# Patient Record
Sex: MALE | URBAN - METROPOLITAN AREA
[De-identification: names, ages, dates, MRNs, and addresses within clinical notes are randomized per-mention and may not be internally consistent; named-entity substitution may affect disease eponyms.]

---

## 2021-04-28 ENCOUNTER — HOSPITAL ENCOUNTER (OUTPATIENT)
Dept: GENERAL RADIOLOGY | Age: 61
Discharge: HOME | End: 2021-04-30

## 2021-04-28 DIAGNOSIS — T14.90XA INJURY: ICD-10-CM

## 2021-04-28 PROCEDURE — 73562 X-RAY EXAM OF KNEE 3: CPT

## 2023-03-22 ENCOUNTER — HOSPITAL ENCOUNTER (OUTPATIENT)
Dept: PHYSICAL THERAPY | Facility: CLINIC | Age: 63
Setting detail: THERAPIES SERIES
Discharge: HOME | End: 2023-03-22
Payer: COMMERCIAL

## 2023-03-22 PROCEDURE — 97161 PT EVAL LOW COMPLEX 20 MIN: CPT

## 2023-03-22 PROCEDURE — 97110 THERAPEUTIC EXERCISES: CPT

## 2023-03-22 NOTE — CONSULTS
services in order to: Improve radha LE flexibility, improve radha LE strength, and help reduce numbness in L LE to help ease biking recreationally. Problems:    [x] ? Pain  [x] ? ROM  [x] ? Strength  [x] ? Function        Goals  MET NOT MET ON-  GOING  Details   Date Addressed:       STG: To be met in 5 treatments           1. Improve score on assessment tool LEFS from 33% impairment to less than 23% impairment, demonstrating improved tolerance to activity to ease biking recreationally. []  []  []      2. ? ROM: Increase flexibility in L LE to help improve LE symptoms and ease sitting at work for extended periods. []  []  []      3. Independent with Home Exercise Programs []  []  []      []  []  []     Date Addressed:        LTG: To be met in 9 treatments       1. Improve score on assessment tool LEFS from 33% impairment to less than 13% impairment, demonstrating improved tolerance to activity to ease biking recreationally. []  []  []     2. Reduce pain levels to 0/10 with all activity to help reduce risk for compensations with all walking and lifting at work.  []  []  []     3. ? Strength: Increase radha hip/core strength to 4/5 grossly to help improve trunk stability with all lifting at work. []  []  []                Patient goals: Less pain    Rehab Potential:  [x] Good  [] Fair  [] Poor   Suggested Professional Referral:  [x] No  [] Yes:  Barriers to Goal Achievement[de-identified]  [x] No  [] Yes:  Domestic Concerns:  [x] No  [] Yes:    Pt. Education:  [x] Plans/Goals, Risks/Benefits discussed  [x] Home exercise program    Method of Education: [x] Verbal  [x] Demo  [x] Written    Access Code: [email or web address]  URL: [employer] com/  Date: 03/22/2023  Prepared by:  Jennifer Bryan    Exercises  - Standing Hamstring Stretch with Step  - 3 x daily - 7 x weekly - 3 sets - 30 seconds hold  - Hip Flexor Stretch with Chair  - 3 x daily - 7 x weekly - 3 sets - 30 seconds hold  - Standing ITB Stretch  - 3 x daily - 7 x weekly - 3

## 2023-03-25 ENCOUNTER — HOSPITAL ENCOUNTER (OUTPATIENT)
Dept: MRI IMAGING | Age: 63
End: 2023-03-25
Payer: COMMERCIAL

## 2023-03-25 DIAGNOSIS — S76.012A STRAIN OF HIP, LEFT, INITIAL ENCOUNTER: ICD-10-CM

## 2023-03-25 PROCEDURE — 73721 MRI JNT OF LWR EXTRE W/O DYE: CPT

## 2023-03-27 ENCOUNTER — HOSPITAL ENCOUNTER (OUTPATIENT)
Dept: PHYSICAL THERAPY | Facility: CLINIC | Age: 63
Setting detail: THERAPIES SERIES
Discharge: HOME | End: 2023-03-27
Payer: COMMERCIAL

## 2023-03-27 PROCEDURE — 97110 THERAPEUTIC EXERCISES: CPT

## 2023-03-27 NOTE — FLOWSHEET NOTE
[x] [address]    Outpatient Rehabilitation &  Therapy  [address] [facility]  P: [phone]  F: [phone]      Physical Therapy Daily Treatment Note    Date:  3/27/2023  Patient: Stefany Dumas                     : 1960                      MRN: [id number]  Physician: ANA Chan                        Insurance:  (3x/week x 3 weeks; 3/8/23-23)  Medical Diagnosis: A75.463L (ICD-10-CM) - Strain of unspecified muscles, fascia and tendons at thigh level, left thigh, initial encounter                   Rehab Codes: M25. 552, R20.2  Onset date: 23                           Next Dr's appt. : 3/27/23  Visit# / total visits:      Cancels/No Shows:     Subjective: pt states he felt pretty good after eval, noting that he was surprised. Some pain and soreness from work. Pain:  [x] Yes  [] No Location: L hip  Pain Rating: (0-10 scale) 1/10  Pain altered Tx:  [] No  [] Yes  Action:  Comments:    Todays Treatment:  Precautions: General  Exercise  L Hip Reps/ Time Weight/ Level Comments   scifit 6'  L1                SB S 3x30\"       HS step S 3x30\"       Hip flexor step S 3x30\"       SKTC S 3x30\"       IT band S 3x30\"   L Hip only             Bridges 2x10 orange      Clamshells 2x10 orange     SL hip abd 2x10 orange              Monsterwalks   2 laps  orange      SB wall squats 20x       Powerstride  20x  6\"      Other:     Specific Instructions for next treatment: Continue tx per POC      Treatment Charges: Mins Units Time In/Out   []  Modalities         [x]  Ther Exercise 40 3 6:00-6:45   []  Neuromuscular Re-ed         []  Gait Training         []  Manual Therapy         []  Ther Activities         []  Aquatics         []  Vasocompression         []  Cervical Traction         []  Other         Total Treatment time 40 min 3         Assessment: [x] Progressing toward goals. Initiated treatment on scifit followed by stretches with good tolerance.  Added in strengthening

## 2023-03-29 ENCOUNTER — HOSPITAL ENCOUNTER (OUTPATIENT)
Dept: PHYSICAL THERAPY | Facility: CLINIC | Age: 63
Setting detail: THERAPIES SERIES
Discharge: HOME | End: 2023-03-29
Payer: COMMERCIAL

## 2023-03-29 PROCEDURE — 97110 THERAPEUTIC EXERCISES: CPT

## 2023-03-29 NOTE — FLOWSHEET NOTE
[x] [address]    Outpatient Rehabilitation &  Therapy  [address] [place] [address]  P: [phone]  F: [phone]      Physical Therapy Daily Treatment Note    Date:  3/29/2023  Patient: Akash Bustos                     : 1960                      MRN: [id number]  Physician: ANA Bowen                        Insurance:  (3x/week x 3 weeks; 3/8/23-23)  Medical Diagnosis: S44.040F (ICD-10-CM) - Strain of unspecified muscles, fascia and tendons at thigh level, left thigh, initial encounter                   Rehab Codes: M25. 552, R20.2  Onset date: 23                           Next Dr's appt. : 3/27/23  Visit# / total visits: 3/9     Cancels/No Shows:     Subjective:   Pain:  [] Yes  [x] No Location: L hip  Pain Rating: (0-10 scale) 1/10  Pain altered Tx:  [] No  [] Yes  Action:  Comments: Pt reports numbness is covering less of an area. Todays Treatment:  Precautions: General  Exercise  L Hip Reps/ Time Weight/ Level Comments           Airdyne 8'    x              SB S 3x30\"     x   HS step S 3x30\"     x   Hip flexor step S 3x30\"     x   SKTC S 3x30\"     x   IT band S 3x30\"   L Hip only               Bridges 2x15 orange    x   Clamshells 2x15 orange   x   SL hip abd 2x15 orange   x             Monsterwalks   2 laps  orange    x   SB wall squats 20x     x   Powerstride  20x  6\"       4 way hip X15 B orange  x   Other:     Specific Instructions for next treatment: Increase tband resistance      Treatment Charges: Mins Units Time In/Out   []  Modalities         [x]  Ther Exercise 44 3 3:40-4:24   []  Neuromuscular Re-ed         []  Gait Training         []  Manual Therapy         []  Ther Activities         []  Aquatics         []  Vasocompression         []  Cervical Traction         []  Other         Total Treatment time 44 3     8' warm up not billed - 3:32-3:40    Assessment: [x] Progressing toward goals. Airdyne for a warm up followed by stretches.  Progressed

## 2023-03-30 ENCOUNTER — HOSPITAL ENCOUNTER (OUTPATIENT)
Dept: PHYSICAL THERAPY | Facility: CLINIC | Age: 63
Setting detail: THERAPIES SERIES
Discharge: HOME | End: 2023-03-30
Payer: COMMERCIAL

## 2023-03-30 PROCEDURE — 97110 THERAPEUTIC EXERCISES: CPT

## 2023-03-30 NOTE — FLOWSHEET NOTE
[facility]  [address]  Phone: [phone]  Fax: [phone]      Physical Therapy Daily Treatment Note    Date:  3/30/2023  Patient: Arvind Fuller                     : 1960                      MRN: [id number]  Physician: ANA Houser                        Insurance:  (3x/week x 3 weeks; 3/8/23-23)  Medical Diagnosis: L16.283S (ICD-10-CM) - Strain of unspecified muscles, fascia and tendons at thigh level, left thigh, initial encounter                   Rehab Codes: M25. 552, R20.2  Onset date: 23                           Next Dr's appt. : 3/27/23  Visit# / total visits:      Cancels/No Shows: 0/0    Subjective: Pt arriving with no pain, but does have some numbness/tingling. Reports he is sore from yesterday's session. Pain:  [] Yes  [x] No  Location: L hip  Pain Rating: (0-10 scale) 0/10  Pain altered Tx:  [x] No  [] Yes  Action:  Comments:     Todays Treatment:  Precautions: General  Exercise  L Hip Reps/ Time Weight/ Level Comments           Airdyne 8'    x              SB S 3x30\"     x   HS step S 3x30\"     x   Hip flexor step S 3x30\"     x   SKTC S 3x30\"     x   IT band S 3x30\"   L Hip only               Bridges 2x15 orange    x   Clamshells 2x15 orange   x   SL hip abd 2x15 orange   x             Monsterwalks   3 laps  orange    x   SB wall squats 20x     x   Powerstride  20x  6\"       3 way hip 2x10 orange  x   Other:     Specific Instructions for next treatment: Increase tband resistance      Treatment Charges: Mins Units Time In/Out   []  Modalities         [x]  Ther Exercise 42 7 [id number]   []  Neuromuscular Re-ed         []  Gait Training         []  Manual Therapy         []  Ther Activities         []  Aquatics         []  Vasocompression         []  Cervical Traction         []  Other         Total Treatment time 42 3         Assessment: [x] Progressing toward goals.  Continued tx per exercise log

## 2023-04-03 ENCOUNTER — HOSPITAL ENCOUNTER (OUTPATIENT)
Dept: PHYSICAL THERAPY | Facility: CLINIC | Age: 63
Setting detail: THERAPIES SERIES
Discharge: HOME | End: 2023-04-03
Payer: COMMERCIAL

## 2023-04-03 ENCOUNTER — APPOINTMENT (OUTPATIENT)
Dept: PHYSICAL THERAPY | Facility: CLINIC | Age: 63
End: 2023-04-03
Payer: COMMERCIAL

## 2023-04-03 PROCEDURE — 97110 THERAPEUTIC EXERCISES: CPT

## 2023-04-03 NOTE — PROGRESS NOTES
program today with increased resistance with all mat exercises and monsterwalks, as well as increased reps with powerstrides and Pball wall squats. Cueing needed with monsterwalks to avoid dragging the back foot and to avoid turning lead foot out, good carryover. Pt denies any increase in pain following progressions made today. Since starting therapy, pt reports that he has seen some improvements in the L LE. [place] Per pt, he reports that he has seen an improvement in his L LE strength, noting that less of the L leg is experiencing numbness, as well as noting that he is having an easier ability to do work around the house. Currently, pt still reports that he has numbness in the foot, as well as noting that he is still having issue with sitting for extended periods. At this time, plan to continue with PT per POC to keep working on improving LE symptoms and strength with pt in agreement with plan.     [] No change. [] Other:      Goals  MET NOT MET ON-  GOING  Details   Date Addressed: 4/3/23           STG: To be met in 5 treatments            1. Improve score on assessment tool LEFS from 33% impairment to less than 23% impairment, demonstrating improved tolerance to activity to ease biking recreationally. []  []  [x]   Pt scored 24% impaired   2. ? ROM: Increase flexibility in L LE to help improve LE symptoms and ease sitting at work for extended periods. []  []  [x]   Pt reports that he is still having issues with sitting for extended periods. 3. Independent with Home Exercise Programs [x]  []  []        []  []  []      Date Addressed:            LTG: To be met in 9 treatments           1. Improve score on assessment tool LEFS from 33% impairment to less than 13% impairment, demonstrating improved tolerance to activity to ease biking recreationally. []  []  []      2. Reduce pain levels to 0/10 with all activity to help reduce risk for compensations with all walking and lifting at work.  []  []  []      3. ?

## 2023-04-04 ENCOUNTER — HOSPITAL ENCOUNTER (OUTPATIENT)
Dept: PHYSICAL THERAPY | Facility: CLINIC | Age: 63
Setting detail: THERAPIES SERIES
Discharge: HOME | End: 2023-04-04
Payer: COMMERCIAL

## 2023-04-04 NOTE — FLOWSHEET NOTE
[] [address]  [address]    [phone]  F: [phone]   [] [address]  [facility] [address]   [address]  P: [phone]  F: [phone]  [x] [address] &  Therapy  [address]  P: [phone]  F: [phone] [] [address]  P: [phone]  F: [phone]  [] [address] [facility]   [address]   [place]: [phone]  F: [phone]   [] [address]  [place]: [phone]   F: [phone]     Physical Therapy Cancel/No Show note    Date: 2023  Patient: Marcie Loaiza  : 1960  MRN: [id number]    Cancels/No Shows to date: 1 cx / 0 ns    For today's appointment patient:    [x]  Cancelled    [] Rescheduled appointment    [] No-show     Reason given by patient:    []  Patient ill    []  Conflicting appointment    [] No transportation      [x] Conflict with work    [] No reason given    [] Weather related    [] [id number]    [] Other:      Comments:        [x] Next appointment was confirmed    Electronically signed by: Eileen Flower

## 2023-04-05 ENCOUNTER — HOSPITAL ENCOUNTER (OUTPATIENT)
Dept: PHYSICAL THERAPY | Facility: CLINIC | Age: 63
Setting detail: THERAPIES SERIES
Discharge: HOME | End: 2023-04-05
Payer: COMMERCIAL

## 2023-04-05 PROCEDURE — 97110 THERAPEUTIC EXERCISES: CPT

## 2023-04-05 NOTE — FLOWSHEET NOTE
complaints throughout. Increased height with powerstrides and added leg press to keep working on progressing radha LE strength. Conitnues to report fatigue with mat exercises so help progressing reps today. Continue to progress strengthening program as able. [] No change. [] Other:      Goals  MET NOT MET ON-  GOING  Details   Date Addressed: 4/3/23           STG: To be met in 5 treatments            1. Improve score on assessment tool LEFS from 33% impairment to less than 23% impairment, demonstrating improved tolerance to activity to ease biking recreationally. []  []  [x]   Pt scored 24% impaired   2. ? ROM: Increase flexibility in L LE to help improve LE symptoms and ease sitting at work for extended periods. []  []  [x]   Pt reports that he is still having issues with sitting for extended periods. 3. Independent with Home Exercise Programs [x]  []  []        []  []  []      Date Addressed:            LTG: To be met in 9 treatments           1. Improve score on assessment tool LEFS from 33% impairment to less than 13% impairment, demonstrating improved tolerance to activity to ease biking recreationally. []  []  []      2. Reduce pain levels to 0/10 with all activity to help reduce risk for compensations with all walking and lifting at work.  []  []  []      3. ? Strength: Increase radha hip/core strength to 4/5 grossly to help improve trunk stability with all lifting at work. []  []  []                        Patient goals: Less pain      Pt. Education:  [x] Yes  [] No  [x] Reviewed Prior HEP/Ed  Method of Education: [x] Verbal  [x] Demo  [] Written  Comprehension of Education:  [x] Verbalizes understanding. [x] Demonstrates understanding. [x] Needs review. [] Demonstrates/verbalizes HEP/Ed previously given. Plan: [x] Continue with current plan of care towards goals. Time In: 1700           Time Out: 1750    Electronically signed by:   Geovany Palafox, PT

## 2023-04-06 ENCOUNTER — OFFICE VISIT (OUTPATIENT)
Dept: ORTHOPEDIC SURGERY | Age: 63
End: 2023-04-06

## 2023-04-06 VITALS — WEIGHT: 202 LBS | HEIGHT: 70 IN | BODY MASS INDEX: 28.92 KG/M2

## 2023-04-06 DIAGNOSIS — S76.012A STRAIN OF HIP AND THIGH, LEFT, INITIAL ENCOUNTER: Primary | ICD-10-CM

## 2023-04-06 DIAGNOSIS — S76.012A MUSCLE STRAIN OF GLUTEAL REGION, LEFT, INITIAL ENCOUNTER: ICD-10-CM

## 2023-04-06 DIAGNOSIS — S76.912A STRAIN OF HIP AND THIGH, LEFT, INITIAL ENCOUNTER: Primary | ICD-10-CM

## 2023-04-06 RX ORDER — MELOXICAM 15 MG/1
15 TABLET ORAL DAILY
COMMUNITY
Start: 2023-03-14

## 2023-04-07 ASSESSMENT — ENCOUNTER SYMPTOMS
SHORTNESS OF BREATH: 0
VOMITING: 0
COLOR CHANGE: 0
COUGH: 0

## 2023-04-07 NOTE — PROGRESS NOTES
very well and has noticed improvement in his discomfort and numbness within the left lower extremity. Past Medical History:    History reviewed. No pertinent past medical history. Past Surgical History:    History reviewed. No pertinent surgical history. Current Medications:   Current Outpatient Medications   Medication Sig Dispense Refill    meloxicam (MOBIC) 15 MG tablet Take 1 tablet by mouth daily       No current facility-administered medications for this visit. Allergies:    Patient has no known allergies. Social History:   Social History     Socioeconomic History    Marital status: Unknown     Spouse name: Not on file    Number of children: Not on file    Years of education: Not on file    Highest education level: Not on file   Occupational History    Not on file   Tobacco Use    Smoking status: Never    Smokeless tobacco: Not on file   Substance and Sexual Activity    Alcohol use: Not on file    Drug use: Not on file    Sexual activity: Not on file   Other Topics Concern    Not on file   Social History Narrative    Not on file     Social Determinants of Health     Financial Resource Strain: Not on file   Food Insecurity: Not on file   Transportation Needs: Not on file   Physical Activity: Not on file   Stress: Not on file   Social Connections: Not on file   Intimate Partner Violence: Not on file   Housing Stability: Not on file       Family History:  History reviewed. No pertinent family history. REVIEW OF SYSTEMS:  Review of Systems   Constitutional:  Negative for activity change and fever. HENT:  Negative for sneezing. Respiratory:  Negative for cough and shortness of breath. Cardiovascular:  Negative for chest pain. Gastrointestinal:  Negative for vomiting. Musculoskeletal:  Positive for arthralgias (left hip). Negative for joint swelling and myalgias. Skin:  Negative for color change. Neurological:  Positive for numbness (anterior thigh left).  Negative for

## 2023-04-19 ENCOUNTER — HOSPITAL ENCOUNTER (OUTPATIENT)
Dept: PHYSICAL THERAPY | Facility: CLINIC | Age: 63
Setting detail: THERAPIES SERIES
Discharge: HOME | End: 2023-04-19
Payer: COMMERCIAL

## 2023-04-19 PROCEDURE — 97110 THERAPEUTIC EXERCISES: CPT

## 2023-04-19 NOTE — DISCHARGE SUMMARY
[facility]  [address] [facility]  Phone: [phone]  Fax: [phone]      Physical Therapy Daily Treatment Note/ Discharge Note    Date:  2023  Patient: Jeanie Holder                     : 1960                      MRN: [id number]  Physician: ANA Mueller                        Insurance:  (3x/week x 3 weeks; 3/8/23-23)  Medical Diagnosis: S43.069Y (ICD-10-CM) - Strain of unspecified muscles, fascia and tendons at thigh level, left thigh, initial encounter                   Rehab Codes: M25. 552, R20.2  Onset date: 23                           Next Dr's appt. : 3/27/23  Visit# / total visits:      Cancels/No Shows: 0/0    Subjective: Pt continues to deny any pain upon arrival to therapy, no new complaints. Pain:  [] Yes  [x] No  Location: L hip  Pain Rating: (0-10 scale) 0/10  Pain altered Tx:  [x] No  [] Yes  Action:  Comments:     Todays Treatment:  Precautions: General  Exercise  L Hip Reps/ Time Weight/ Level Comments    Airdyne 8'    x              SB S 3x30\"     x   HS step S 3x30\"     x   Hip flexor step S 3x30\"     x   SKTC S 3x30\"     x   IT band S 3x30\"   L Hip only               Bridges 3x10, 5\" Lime    x   Clamshells 3x10 Lime   x   SL hip abd 3x10 Lime   x   SLR  3x10     x          Monsterwalks   3 laps  Lime   x   Wall sits 2x10     x   Powerstride  2x10 8\"       3 way hip 2x12 Lime  x   Leg press 3x10 165#     Lat Step Ups 2x10 6\"     Other:     Specific Instructions for next treatment: Pt to be DC from PT      Treatment Charges: Mins Units Time In/Out   []  Modalities         [x]  Ther Exercise 39 3 [id number]   []  Neuromuscular Re-ed       []  Gait Training       []  Manual Therapy       []  Ther Activities       []  Aquatics       []  Vasocompression       []  Cervical Traction       []  Other       Total Treatment time 39 3         Assessment: [x] Progressing toward goals.  Focused today's session

## 2023-05-01 ENCOUNTER — OFFICE VISIT (OUTPATIENT)
Dept: ORTHOPEDIC SURGERY | Age: 63
End: 2023-05-01

## 2023-05-01 VITALS — HEIGHT: 70 IN | BODY MASS INDEX: 29.35 KG/M2 | WEIGHT: 205 LBS

## 2023-05-01 DIAGNOSIS — R20.0 NUMBNESS AND TINGLING OF LEFT LEG: ICD-10-CM

## 2023-05-01 DIAGNOSIS — R20.2 NUMBNESS AND TINGLING OF LEFT LEG: ICD-10-CM

## 2023-05-01 DIAGNOSIS — S76.912D MUSCLE STRAIN OF LEFT THIGH, SUBSEQUENT ENCOUNTER: ICD-10-CM

## 2023-05-01 DIAGNOSIS — S76.012D STRAIN OF LEFT HIP AND THIGH, SUBSEQUENT ENCOUNTER: Primary | ICD-10-CM

## 2023-05-01 DIAGNOSIS — S76.912D STRAIN OF LEFT HIP AND THIGH, SUBSEQUENT ENCOUNTER: Primary | ICD-10-CM

## 2023-05-01 NOTE — PROGRESS NOTES
[address]  [address] [facility] [address]  Dept: [phone]  Dept Fax: [phone]        Ambulatory Follow Up [facility]      Subjective:   Ayleen Espinal is a 65y.o. year old male who presents to our office today for routine followup regarding his   1. Strain of left hip and thigh, subsequent encounter    2. Muscle strain of left thigh, subsequent encounter    3. Numbness and tingling of left leg    . Chief Complaint   Patient presents with    Follow-up     Left hip pain       Date of Injury: 1/23/2023  [facility] Claim #: [id number]     Approved Dx:  U43.472 - strain of hip and thigh left  S76.012 - muscle strain of gluteal region left      HPI Ayleen Espinal  is a 65 y.o.  male who presents today in follow for Left hip pain with numbness and tingling noted on the anterior left thigh into the left foot. Patient's initial injury occurred on 1/23/2023 due to a work-related incident at [employer] as a . The patient was last seen on 4/6/2023 and underwent treatment in the form of completing her remaining outpatient physical therapy appointments and continuing to work without restrictions. The patient notes he completed PT and noticed improvement in his pain but still has residual numbness and tingling along the anterior thigh, medial shin and into the arch of his foot consistent along L3-L4 nerve distribution. Patient notes that he does not have significant pain within the hip but continues to have the numbness and tingling which was not present prior to the injury on 1/23/2023. He notes that it is very slowly improving but he is concerned that there is more damage within his back that may be causing this numbness. Review of Systems   Constitutional:  Negative for activity change and fever. HENT:  Negative for sneezing. Respiratory:  Negative for cough and shortness of breath.     Cardiovascular:

## 2023-05-05 ASSESSMENT — ENCOUNTER SYMPTOMS
COLOR CHANGE: 0
VOMITING: 0
SHORTNESS OF BREATH: 0
COUGH: 0

## 2023-05-15 ENCOUNTER — TELEPHONE (OUTPATIENT)
Dept: ORTHOPEDIC SURGERY | Age: 63
End: 2023-05-15

## 2023-05-15 NOTE — TELEPHONE ENCOUNTER
Spoke with patient to inform him that his mri for his lumbar spine was denied and he could reach out to his  for further instructions.  Verbalized understanding

## 2023-06-19 ENCOUNTER — TELEPHONE (OUTPATIENT)
Dept: ORTHOPEDIC SURGERY | Age: 63
End: 2023-06-19

## 2023-06-19 NOTE — TELEPHONE ENCOUNTER
Bebe Ceballos from [facility] called with questions about follow up appointment.  Informed her that appointment was on 7/10/23

## 2023-07-12 ENCOUNTER — OFFICE VISIT (OUTPATIENT)
Dept: ORTHOPEDIC SURGERY | Age: 63
End: 2023-07-12

## 2023-07-12 VITALS — HEIGHT: 70 IN | WEIGHT: 205 LBS | BODY MASS INDEX: 29.35 KG/M2

## 2023-07-12 DIAGNOSIS — M51.26 LUMBAR DISC HERNIATION: Primary | ICD-10-CM

## 2023-07-18 ENCOUNTER — TELEPHONE (OUTPATIENT)
Dept: ORTHOPEDIC SURGERY | Age: 63
End: 2023-07-18

## 2023-08-28 ENCOUNTER — TELEPHONE (OUTPATIENT)
Dept: ORTHOPEDIC SURGERY | Age: 63
End: 2023-08-28

## 2023-08-28 NOTE — TELEPHONE ENCOUNTER
Spoke with Maninder Bardales from [facility]. This patient dx for Anterior Labrum Tear is DISALLOWED. Lumbar disc herniation, L3-L5 radiculopathy with suspicion of disc herniation is in hearing.

## 2023-10-26 ENCOUNTER — TELEPHONE (OUTPATIENT)
Dept: ORTHOPEDIC SURGERY | Age: 63
End: 2023-10-26

## 2023-10-26 NOTE — TELEPHONE ENCOUNTER
Pt was seeing Nawaf Arana for a WC claim back in June and July and she had requested a MRI of the Lumber Spine but it was denied by [facility] due to the order not having enough information because it was listed for a hip issue, and there was no mention of the numbness in his legs.  advised that we need to re-submit a new order for the MRI for the Lumbar Spine stating reason to check numbness in his legs so it can be approved.       Please call pt with any questions @ [phone]

## 2023-10-26 NOTE — TELEPHONE ENCOUNTER
Called Fay from [facility]. She states that all conditions concerning this patients back have been disallowed as of 10/24/2023. Called patient back and attempted to explain this to the patient. Patient is not receiving this information. Keeps saying that the people from [facility] are working against him and have been this whole time. Patient is insistent that [facility] stated he should call our office with above information. Told patient to have someone from [facility] contact our office.

## 2024-02-19 ENCOUNTER — OFFICE VISIT (OUTPATIENT)
Age: 64
End: 2024-02-19
Payer: COMMERCIAL

## 2024-02-19 VITALS
HEIGHT: 70 IN | DIASTOLIC BLOOD PRESSURE: 80 MMHG | HEART RATE: 75 BPM | SYSTOLIC BLOOD PRESSURE: 132 MMHG | WEIGHT: 192.6 LBS | TEMPERATURE: 98.2 F | BODY MASS INDEX: 27.57 KG/M2 | OXYGEN SATURATION: 96 %

## 2024-02-19 DIAGNOSIS — M54.16 LUMBAR RADICULOPATHY, CHRONIC: ICD-10-CM

## 2024-02-19 DIAGNOSIS — M25.852 FEMOROACETABULAR IMPINGEMENT OF LEFT HIP: ICD-10-CM

## 2024-02-19 DIAGNOSIS — M51.36 DDD (DEGENERATIVE DISC DISEASE), LUMBAR: Primary | ICD-10-CM

## 2024-02-19 PROCEDURE — 99213 OFFICE O/P EST LOW 20 MIN: CPT | Performed by: FAMILY MEDICINE

## 2024-02-19 PROCEDURE — G8484 FLU IMMUNIZE NO ADMIN: HCPCS | Performed by: FAMILY MEDICINE

## 2024-02-19 PROCEDURE — G8427 DOCREV CUR MEDS BY ELIG CLIN: HCPCS | Performed by: FAMILY MEDICINE

## 2024-02-19 PROCEDURE — G8419 CALC BMI OUT NRM PARAM NOF/U: HCPCS | Performed by: FAMILY MEDICINE

## 2024-02-19 PROCEDURE — 3017F COLORECTAL CA SCREEN DOC REV: CPT | Performed by: FAMILY MEDICINE

## 2024-02-19 PROCEDURE — 1036F TOBACCO NON-USER: CPT | Performed by: FAMILY MEDICINE

## 2024-02-19 RX ORDER — CELECOXIB 200 MG/1
200 CAPSULE ORAL 2 TIMES DAILY
Qty: 60 CAPSULE | Refills: 3 | Status: SHIPPED | OUTPATIENT
Start: 2024-02-19

## 2024-02-19 SDOH — ECONOMIC STABILITY: FOOD INSECURITY: WITHIN THE PAST 12 MONTHS, THE FOOD YOU BOUGHT JUST DIDN'T LAST AND YOU DIDN'T HAVE MONEY TO GET MORE.: NEVER TRUE

## 2024-02-19 SDOH — ECONOMIC STABILITY: HOUSING INSECURITY
IN THE LAST 12 MONTHS, WAS THERE A TIME WHEN YOU DID NOT HAVE A STEADY PLACE TO SLEEP OR SLEPT IN A SHELTER (INCLUDING NOW)?: NO

## 2024-02-19 SDOH — ECONOMIC STABILITY: FOOD INSECURITY: WITHIN THE PAST 12 MONTHS, YOU WORRIED THAT YOUR FOOD WOULD RUN OUT BEFORE YOU GOT MONEY TO BUY MORE.: NEVER TRUE

## 2024-02-19 SDOH — ECONOMIC STABILITY: INCOME INSECURITY: HOW HARD IS IT FOR YOU TO PAY FOR THE VERY BASICS LIKE FOOD, HOUSING, MEDICAL CARE, AND HEATING?: NOT HARD AT ALL

## 2024-02-19 ASSESSMENT — PATIENT HEALTH QUESTIONNAIRE - PHQ9
SUM OF ALL RESPONSES TO PHQ QUESTIONS 1-9: 0
1. LITTLE INTEREST OR PLEASURE IN DOING THINGS: 0
2. FEELING DOWN, DEPRESSED OR HOPELESS: 0
SUM OF ALL RESPONSES TO PHQ9 QUESTIONS 1 & 2: 0
SUM OF ALL RESPONSES TO PHQ QUESTIONS 1-9: 0

## 2024-02-19 NOTE — PROGRESS NOTES
[facility]     Date of Visit:  2024  Patient Name: Bartolo Solano   Patient :  1960     CHIEF COMPLAINT/HPI:     Bartolo Solano is a 63 y.o. male who presents today for an general visit to be evaluated for the following condition(s):  Chief Complaint   Patient presents with    Check-Up     Patient is here for a check up. He has hip pain.  Had a CT and went through PT. He has numbness from his foot up to his hip.    Patient with L hip pain and numbness L foot.  Has noticed this over the past 1 year.  His pain in L hip is worse.  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  L hip MRI showed a little tear in the labrum and arthritis.  Patient did therapy for his L hip and back and did not do anything.  If patient sits for 10 minutes takes him awhile to stand back up.  Initially was workman's comp but then everything ended up getting denied.  His nunmbness of his L foot has never gone away.  Previously saw Dr. Delatorre who wanted to put a silocone injection in his hip.    REVIEW OF SYSTEM      Review of Systems   Respiratory:  Negative for chest tightness and shortness of breath.    Cardiovascular:  Negative for chest pain.         REVIEWED INFORMATION      No Known Allergies    No current outpatient medications on file.     No current facility-administered medications for this visit.        There is no problem list on file for this patient.      No past medical history on file.    No past surgical history on file.     Social History     Socioeconomic History    Marital status:      Spouse name: None    Number of children: None    Years of education: None    Highest education level: None   Tobacco Use    Smoking status: Never    Smokeless tobacco: Never   Substance and Sexual Activity    Alcohol use: Yes     Social Determinants of Health     Financial Resource Strain: Low Risk  (2024)    Overall Financial Resource Strain (CARDIA)

## 2024-02-20 ASSESSMENT — ENCOUNTER SYMPTOMS
SHORTNESS OF BREATH: 0
CHEST TIGHTNESS: 0

## 2024-03-05 ENCOUNTER — HOSPITAL ENCOUNTER (OUTPATIENT)
Dept: MRI IMAGING | Age: 64
Discharge: HOME | End: 2024-03-07
Attending: FAMILY MEDICINE
Payer: COMMERCIAL

## 2024-03-05 DIAGNOSIS — M51.36 DDD (DEGENERATIVE DISC DISEASE), LUMBAR: ICD-10-CM

## 2024-03-05 DIAGNOSIS — M54.16 LUMBAR RADICULOPATHY, CHRONIC: ICD-10-CM

## 2024-03-05 PROCEDURE — 72148 MRI LUMBAR SPINE W/O DYE: CPT

## 2024-07-03 ENCOUNTER — HOSPITAL ENCOUNTER (OUTPATIENT)
Dept: NEUROLOGY | Age: 64
Discharge: HOME | End: 2024-07-03
Payer: COMMERCIAL

## 2024-07-03 PROCEDURE — 95910 NRV CNDJ TEST 7-8 STUDIES: CPT | Performed by: PHYSICAL MEDICINE & REHABILITATION

## 2024-07-03 PROCEDURE — 95886 MUSC TEST DONE W/N TEST COMP: CPT | Performed by: PHYSICAL MEDICINE & REHABILITATION

## 2025-01-25 ENCOUNTER — APPOINTMENT (OUTPATIENT)
Dept: CT IMAGING | Age: 65
End: 2025-01-25
Payer: COMMERCIAL

## 2025-01-25 ENCOUNTER — HOSPITAL ENCOUNTER (EMERGENCY)
Age: 65
Discharge: HOME | End: 2025-01-25
Attending: EMERGENCY MEDICINE
Payer: COMMERCIAL

## 2025-01-25 VITALS
WEIGHT: 190 LBS | HEART RATE: 71 BPM | RESPIRATION RATE: 16 BRPM | HEIGHT: 70 IN | SYSTOLIC BLOOD PRESSURE: 140 MMHG | DIASTOLIC BLOOD PRESSURE: 85 MMHG | BODY MASS INDEX: 27.2 KG/M2 | TEMPERATURE: 98.4 F | OXYGEN SATURATION: 96 %

## 2025-01-25 DIAGNOSIS — K11.20 PAROTIDITIS: Primary | ICD-10-CM

## 2025-01-25 LAB
ANION GAP SERPL CALCULATED.3IONS-SCNC: 10 MMOL/L (ref 9–17)
BASOPHILS # BLD: 0 K/UL (ref 0–0.2)
BASOPHILS NFR BLD: 0 % (ref 0–2)
BUN SERPL-MCNC: 18 MG/DL (ref 8–23)
CALCIUM SERPL-MCNC: 9.4 MG/DL (ref 8.6–10.4)
CHLORIDE SERPL-SCNC: 104 MMOL/L (ref 98–107)
CO2 SERPL-SCNC: 24 MMOL/L (ref 20–31)
CREAT SERPL-MCNC: 0.9 MG/DL (ref 0.7–1.2)
EOSINOPHIL # BLD: 0.1 K/UL (ref 0–0.4)
EOSINOPHILS RELATIVE PERCENT: 1 % (ref 1–4)
ERYTHROCYTE [DISTWIDTH] IN BLOOD BY AUTOMATED COUNT: 13.5 % (ref 12.5–15.4)
GFR, ESTIMATED: >90 ML/MIN/1.73M2
GLUCOSE SERPL-MCNC: 97 MG/DL (ref 70–99)
HCT VFR BLD AUTO: 43.8 % (ref 41–53)
HGB BLD-MCNC: 15 G/DL (ref 13.5–17.5)
LYMPHOCYTES NFR BLD: 1.4 K/UL (ref 1–4.8)
LYMPHOCYTES RELATIVE PERCENT: 17 % (ref 24–44)
MCH RBC QN AUTO: 29.9 PG (ref 26–34)
MCHC RBC AUTO-ENTMCNC: 34.2 G/DL (ref 31–37)
MCV RBC AUTO: 87.5 FL (ref 80–100)
MONOCYTES NFR BLD: 0.6 K/UL (ref 0.1–1.2)
MONOCYTES NFR BLD: 7 % (ref 2–11)
NEUTROPHILS NFR BLD: 75 % (ref 36–66)
NEUTS SEG NFR BLD: 6.3 K/UL (ref 1.8–7.7)
PLATELET # BLD AUTO: 224 K/UL (ref 140–450)
PMV BLD AUTO: 8 FL (ref 6–12)
POTASSIUM SERPL-SCNC: 4.3 MMOL/L (ref 3.7–5.3)
RBC # BLD AUTO: 5 M/UL (ref 4.5–5.9)
SODIUM SERPL-SCNC: 138 MMOL/L (ref 135–144)
WBC OTHER # BLD: 8.4 K/UL (ref 3.5–11)

## 2025-01-25 PROCEDURE — 6360000002 HC RX W HCPCS

## 2025-01-25 PROCEDURE — 2500000003 HC RX 250 WO HCPCS

## 2025-01-25 PROCEDURE — 70491 CT SOFT TISSUE NECK W/DYE: CPT

## 2025-01-25 PROCEDURE — 96374 THER/PROPH/DIAG INJ IV PUSH: CPT

## 2025-01-25 PROCEDURE — 6360000004 HC RX CONTRAST MEDICATION

## 2025-01-25 PROCEDURE — 85025 COMPLETE CBC W/AUTO DIFF WBC: CPT

## 2025-01-25 PROCEDURE — 99285 EMERGENCY DEPT VISIT HI MDM: CPT

## 2025-01-25 PROCEDURE — 80048 BASIC METABOLIC PNL TOTAL CA: CPT

## 2025-01-25 RX ORDER — IOPAMIDOL 755 MG/ML
75 INJECTION, SOLUTION INTRAVASCULAR
Status: COMPLETED | OUTPATIENT
Start: 2025-01-25 | End: 2025-01-25

## 2025-01-25 RX ORDER — 0.9 % SODIUM CHLORIDE 0.9 %
80 INTRAVENOUS SOLUTION INTRAVENOUS ONCE
Status: DISCONTINUED | OUTPATIENT
Start: 2025-01-25 | End: 2025-01-25 | Stop reason: HOSPADM

## 2025-01-25 RX ORDER — KETOROLAC TROMETHAMINE 30 MG/ML
30 INJECTION, SOLUTION INTRAMUSCULAR; INTRAVENOUS ONCE
Status: COMPLETED | OUTPATIENT
Start: 2025-01-25 | End: 2025-01-25

## 2025-01-25 RX ORDER — SODIUM CHLORIDE 0.9 % (FLUSH) 0.9 %
10 SYRINGE (ML) INJECTION ONCE
Status: COMPLETED | OUTPATIENT
Start: 2025-01-25 | End: 2025-01-25

## 2025-01-25 RX ORDER — IBUPROFEN 800 MG/1
800 TABLET, FILM COATED ORAL EVERY 8 HOURS PRN
Qty: 60 TABLET | Refills: 0 | Status: SHIPPED | OUTPATIENT
Start: 2025-01-25

## 2025-01-25 RX ORDER — PREDNISONE 50 MG/1
50 TABLET ORAL DAILY
Qty: 5 TABLET | Refills: 0 | Status: SHIPPED | OUTPATIENT
Start: 2025-01-25 | End: 2025-01-30

## 2025-01-25 RX ADMIN — Medication 80 ML: at 10:40

## 2025-01-25 RX ADMIN — SODIUM CHLORIDE, PRESERVATIVE FREE 10 ML: 5 INJECTION INTRAVENOUS at 10:40

## 2025-01-25 RX ADMIN — KETOROLAC TROMETHAMINE 30 MG: 30 INJECTION, SOLUTION INTRAMUSCULAR at 10:55

## 2025-01-25 RX ADMIN — IOPAMIDOL 75 ML: 755 INJECTION, SOLUTION INTRAVENOUS at 10:40

## 2025-01-25 ASSESSMENT — ENCOUNTER SYMPTOMS
NAUSEA: 0
COUGH: 0
VOMITING: 0
COLOR CHANGE: 0
BACK PAIN: 0
SHORTNESS OF BREATH: 0
CONSTIPATION: 0
ABDOMINAL PAIN: 0
DIARRHEA: 0

## 2025-01-25 ASSESSMENT — PAIN SCALES - GENERAL
PAINLEVEL_OUTOF10: 3
PAINLEVEL_OUTOF10: 4

## 2025-01-25 ASSESSMENT — PAIN DESCRIPTION - LOCATION: LOCATION: JAW

## 2025-01-25 ASSESSMENT — PAIN - FUNCTIONAL ASSESSMENT: PAIN_FUNCTIONAL_ASSESSMENT: 0-10

## 2025-01-25 ASSESSMENT — PAIN DESCRIPTION - ORIENTATION: ORIENTATION: RIGHT

## 2025-01-25 NOTE — ED PROVIDER NOTES
review of systems was reviewed and negative.       PAST MEDICAL HISTORY   History reviewed. No pertinent past medical history.      SURGICAL HISTORY     History reviewed. No pertinent surgical history.      CURRENT MEDICATIONS       Previous Medications    CELECOXIB (CELEBREX) 200 MG CAPSULE    Take 1 capsule by mouth 2 times daily       ALLERGIES     Patient has no known allergies.    FAMILY HISTORY     History reviewed. No pertinent family history.       SOCIAL HISTORY       Social History     Socioeconomic History    Marital status:      Spouse name: None    Number of children: None    Years of education: None    Highest education level: None   Tobacco Use    Smoking status: Never    Smokeless tobacco: Never   Vaping Use    Vaping status: Never Used   Substance and Sexual Activity    Alcohol use: Yes     Comment: occ-twice a week    Drug use: Never     Social Determinants of Health     Financial Resource Strain: Low Risk  (2/19/2024)    Overall Financial Resource Strain (CARDIA)     Difficulty of Paying Living Expenses: Not hard at all   Food Insecurity: No Food Insecurity (2/19/2024)    Hunger Vital Sign     Worried About Running Out of Food in the Last Year: Never true     Ran Out of Food in the Last Year: Never true   Transportation Needs: Unknown (2/19/2024)    PRAPARE - Transportation     Lack of Transportation (Non-Medical): No   Housing Stability: Unknown (2/19/2024)    Housing Stability Vital Sign     Unstable Housing in the Last Year: No       SCREENINGS                         [place] Coma Scale  Eye Opening: Spontaneous  Best Verbal Response: Oriented  Best Motor Response: Obeys commands  [place] Coma Scale Score: 15                     CIWA Assessment  BP: (!) 140/85  Pulse: 71                 PHYSICAL EXAM       ED Triage Vitals [01/25/25 0940]   BP Systolic BP Percentile Diastolic BP Percentile Temp Temp Source Pulse Respirations SpO2   (!) 142/93 -- -- 98.4 °F (36.9 °C) Oral 71 16 95 %             ED BEDSIDE ULTRASOUND:   Performed by ED Physician - none    LABS:  Labs Reviewed   CBC WITH AUTO DIFFERENTIAL - Abnormal; Notable for the following components:       Result Value    Neutrophils % 75 (*)     Lymphocytes % 17 (*)     All other components within normal limits   BASIC METABOLIC PANEL       All other labs were within normal range or not returned as of this dictation.    EMERGENCY DEPARTMENT COURSE and DIFFERENTIAL DIAGNOSIS/MDM:   Vitals:    Vitals:    01/25/25 0940 01/25/25 1053 01/25/25 1054   BP: (!) 142/93 (!) 140/85    Pulse: 71     Resp: 16     Temp: 98.4 °F (36.9 °C)     TempSrc: Oral     SpO2: 95%  96%   Weight: 86.2 kg (190 lb)     Height: 1.778 m (5' 10\")             Medical Decision Making  Differential diagnosis salivary gland stone, abscess, mastoiditis, parotiditis    On exam patient does not appear toxic he does not have any airway compromise or hypoxia.  There is no erythema or warmth to the swollen area.  CT diagnosis is acute right peritonitis with surrounding inflammatory stranding no abscess.  There is no leukocytosis no fever other electrolytes and kidney function are normal.  Patient will be treated with steroids and Motrin and should follow-up closely with primary care    Amount and/or Complexity of Data Reviewed  Labs: ordered.  Radiology: ordered.    Risk  Prescription drug management.            REASSESSMENT          CRITICAL CARE TIME   Total Critical Care time was 0 minutes, excluding separately reportable procedures.  There was a high probability of clinically significant/life threatening deterioration in the patient's condition which required my urgent intervention.      CONSULTS:  None    PROCEDURES:  Unless otherwise noted below, none     Procedures        FINAL IMPRESSION      1. Parotiditis          DISPOSITION/PLAN   DISPOSITION Decision To Discharge 01/25/2025 11:15:02 AM   DISPOSITION CONDITION Stable           PATIENT REFERRED TO:  Didier Atwood MD  900

## 2025-01-25 NOTE — DISCHARGE INSTRUCTIONS
Usually caused by a viral illness or infection similar to mumps, there is no evidence of abscess or bacterial infection at this time.  Take Motrin and steroids as directed use warm compress 20 minutes at a time throughout the day you may also use cool compress 20 minutes at a time throughout the day alternating between the two.  Follow-up with primary care next week for reevaluation as needed

## 2025-01-25 NOTE — ED PROVIDER NOTES
[facility] EMERGENCY DEPARTMENT  eMERGENCY dEPARTMENT eNCOUnter   Independent Attestation     Pt Name: Bartolo Solano  MRN: [id number]  Birthdate 1960  Date of evaluation: 1/25/25       Bartolo Solano is a 64 y.o. male who presents with Jaw Pain (Yesterday about 1630 pt was trying to eat and couldn't d/t sharp shooting pain of the R side/Pt noted swelling on that R side around 2300 lastnight-pt iced it and took ibuprofen/Swelling was worse today and jaw is locked) and Facial Swelling        Based on the medical record, the care appears appropriate. I was personally available for consultation in the Emergency Department.    Jorge Fuchs DO  Attending Emergency  Physician                Jorge Fuchs DO  01/25/25 1050

## 2025-01-27 ENCOUNTER — OFFICE VISIT (OUTPATIENT)
Age: 65
End: 2025-01-27
Payer: COMMERCIAL

## 2025-01-27 VITALS
HEIGHT: 70 IN | HEART RATE: 73 BPM | BODY MASS INDEX: 27.2 KG/M2 | WEIGHT: 190 LBS | DIASTOLIC BLOOD PRESSURE: 84 MMHG | SYSTOLIC BLOOD PRESSURE: 136 MMHG

## 2025-01-27 DIAGNOSIS — K11.21 ACUTE PAROTITIS: Primary | ICD-10-CM

## 2025-01-27 PROBLEM — M75.42 IMPINGEMENT SYNDROME OF LEFT SHOULDER: Status: ACTIVE | Noted: 2025-01-27

## 2025-01-27 PROBLEM — R20.2 PARESTHESIA OF SKIN: Status: ACTIVE | Noted: 2025-01-27

## 2025-01-27 PROBLEM — R51.9 HEADACHE: Status: ACTIVE | Noted: 2025-01-27

## 2025-01-27 PROBLEM — J01.00 ACUTE MAXILLARY SINUSITIS, UNSPECIFIED: Status: ACTIVE | Noted: 2025-01-27

## 2025-01-27 PROBLEM — H66.91 OTITIS MEDIA, UNSPECIFIED, RIGHT EAR: Status: ACTIVE | Noted: 2025-01-27

## 2025-01-27 PROBLEM — M72.0 DUPUYTREN CONTRACTURE: Status: ACTIVE | Noted: 2025-01-27

## 2025-01-27 PROBLEM — S16.1XXA STRAIN OF MUSCLE, FASCIA AND TENDON AT NECK LEVEL, INITIAL ENCOUNTER: Status: ACTIVE | Noted: 2025-01-27

## 2025-01-27 PROCEDURE — 3017F COLORECTAL CA SCREEN DOC REV: CPT | Performed by: STUDENT IN AN ORGANIZED HEALTH CARE EDUCATION/TRAINING PROGRAM

## 2025-01-27 PROCEDURE — G8427 DOCREV CUR MEDS BY ELIG CLIN: HCPCS | Performed by: STUDENT IN AN ORGANIZED HEALTH CARE EDUCATION/TRAINING PROGRAM

## 2025-01-27 PROCEDURE — 1036F TOBACCO NON-USER: CPT | Performed by: STUDENT IN AN ORGANIZED HEALTH CARE EDUCATION/TRAINING PROGRAM

## 2025-01-27 PROCEDURE — G8419 CALC BMI OUT NRM PARAM NOF/U: HCPCS | Performed by: STUDENT IN AN ORGANIZED HEALTH CARE EDUCATION/TRAINING PROGRAM

## 2025-01-27 PROCEDURE — 99213 OFFICE O/P EST LOW 20 MIN: CPT | Performed by: STUDENT IN AN ORGANIZED HEALTH CARE EDUCATION/TRAINING PROGRAM

## 2025-01-27 SDOH — ECONOMIC STABILITY: FOOD INSECURITY: WITHIN THE PAST 12 MONTHS, YOU WORRIED THAT YOUR FOOD WOULD RUN OUT BEFORE YOU GOT MONEY TO BUY MORE.: NEVER TRUE

## 2025-01-27 ASSESSMENT — PATIENT HEALTH QUESTIONNAIRE - PHQ9
SUM OF ALL RESPONSES TO PHQ QUESTIONS 1-9: 0
1. LITTLE INTEREST OR PLEASURE IN DOING THINGS: NOT AT ALL
2. FEELING DOWN, DEPRESSED OR HOPELESS: NOT AT ALL
SUM OF ALL RESPONSES TO PHQ QUESTIONS 1-9: 0
SUM OF ALL RESPONSES TO PHQ9 QUESTIONS 1 & 2: 0

## 2025-01-27 ASSESSMENT — ENCOUNTER SYMPTOMS
RHINORRHEA: 0
CONSTIPATION: 0
SHORTNESS OF BREATH: 0
NAUSEA: 0
CHEST TIGHTNESS: 0
VOMITING: 0
DIARRHEA: 0
SORE THROAT: 0

## 2025-01-27 NOTE — PROGRESS NOTES
Date of Visit:  2025  Patient Name: Bartolo Solano   Patient :  1960     CHIEF COMPLAINT/HPI:     Bartolo Solano is a 64 y.o. male who presents today for an general visit to be evaluated for the following condition(s):  Chief Complaint   Patient presents with    Neck Pain     On Friday, patient eat pumpkin pie and after that his RT side of his neck started to swell. He went to  ER on Saturday. They did scans nothing abnormal expect they noted swelling in his glands. Dx him with Parotiditis.      Patient is here for ER follow-up.  He presented to the ER on 2025.  A few days prior to that he ate a piece of pumpkin pie and it seemed like almost immediately after that the right side of his face and neck started swelling and he then went into the ER due to the swelling and pain.  He had some basic blood work, CBC and BMP that were within normal limits.  CT scan of the face showed parotid swelling on the right but no abscess or lymph nodes.  We reviewed the differential diagnosis for acute parotitis.  He does not have any warning signs or concerning symptoms at this point and he says since being in the ER has been on prednisone and Motrin and the symptoms are improving day by day and the area is getting smaller.          REVIEW OF SYSTEM      Review of Systems   Constitutional:  Negative for chills and fever.   HENT:  Negative for hearing loss, postnasal drip, rhinorrhea and sore throat.         See HPI   Eyes:  Negative for visual disturbance.   Respiratory:  Negative for chest tightness and shortness of breath.    Cardiovascular:  Negative for chest pain and palpitations.   Gastrointestinal:  Negative for constipation, diarrhea, nausea and vomiting.   Genitourinary:  Negative for dysuria.   Musculoskeletal:  Negative for arthralgias.   Skin:  Negative for rash.   Neurological:  Negative for dizziness, weakness, light-headedness, numbness and headaches.         REVIEWED INFORMATION      No